# Patient Record
Sex: MALE | Race: BLACK OR AFRICAN AMERICAN | Employment: UNEMPLOYED | URBAN - METROPOLITAN AREA
[De-identification: names, ages, dates, MRNs, and addresses within clinical notes are randomized per-mention and may not be internally consistent; named-entity substitution may affect disease eponyms.]

---

## 2017-06-23 ENCOUNTER — HOSPITAL ENCOUNTER (INPATIENT)
Facility: HOSPITAL | Age: 47
LOS: 2 days | Discharge: HOME | DRG: 065 | End: 2017-06-26
Attending: EMERGENCY MEDICINE | Admitting: HOSPITALIST
Payer: MEDICAID

## 2017-06-23 ENCOUNTER — APPOINTMENT (OUTPATIENT)
Dept: CT IMAGING | Facility: HOSPITAL | Age: 47
DRG: 065 | End: 2017-06-23
Payer: MEDICAID

## 2017-06-23 DIAGNOSIS — I63.9 ACUTE CVA (CEREBROVASCULAR ACCIDENT) (HCC): ICD-10-CM

## 2017-06-23 DIAGNOSIS — I63.9 CEREBROVASCULAR ACCIDENT (CVA), UNSPECIFIED MECHANISM (HCC): Primary | ICD-10-CM

## 2017-06-23 PROCEDURE — 70450 CT HEAD/BRAIN W/O DYE: CPT | Performed by: EMERGENCY MEDICINE

## 2017-06-24 ENCOUNTER — APPOINTMENT (OUTPATIENT)
Dept: ULTRASOUND IMAGING | Facility: HOSPITAL | Age: 47
DRG: 065 | End: 2017-06-24
Attending: HOSPITALIST
Payer: MEDICAID

## 2017-06-24 ENCOUNTER — APPOINTMENT (OUTPATIENT)
Dept: CV DIAGNOSTICS | Facility: HOSPITAL | Age: 47
DRG: 065 | End: 2017-06-24
Attending: HOSPITALIST
Payer: MEDICAID

## 2017-06-24 ENCOUNTER — APPOINTMENT (OUTPATIENT)
Dept: GENERAL RADIOLOGY | Facility: HOSPITAL | Age: 47
DRG: 065 | End: 2017-06-24
Attending: EMERGENCY MEDICINE
Payer: MEDICAID

## 2017-06-24 LAB
ANION GAP SERPL CALC-SCNC: 8 MMOL/L (ref 0–18)
APTT PPP: 28.8 SECONDS (ref 23.2–35.3)
BASOPHILS # BLD: 0.1 K/UL (ref 0–0.2)
BASOPHILS NFR BLD: 1 %
BUN SERPL-MCNC: 17 MG/DL (ref 8–20)
BUN/CREAT SERPL: 10.2 (ref 10–20)
BZE UR QL SCN: DETECTED
CALCIUM SERPL-MCNC: 8.4 MG/DL (ref 8.5–10.5)
CHLORIDE SERPL-SCNC: 107 MMOL/L (ref 95–110)
CHOLEST SERPL-MCNC: 255 MG/DL (ref 110–200)
CHOLEST SERPL-MCNC: 267 MG/DL (ref 110–200)
CO2 SERPL-SCNC: 27 MMOL/L (ref 22–32)
CREAT SERPL-MCNC: 1.67 MG/DL (ref 0.5–1.5)
EOSINOPHIL # BLD: 0.1 K/UL (ref 0–0.7)
EOSINOPHIL NFR BLD: 2 %
ERYTHROCYTE [DISTWIDTH] IN BLOOD BY AUTOMATED COUNT: 15.9 % (ref 11–15)
ERYTHROCYTE [SEDIMENTATION RATE] IN BLOOD: 13 MM/HR (ref 0–15)
GLUCOSE BLDC GLUCOMTR-MCNC: 120 MG/DL (ref 70–99)
GLUCOSE BLDC GLUCOMTR-MCNC: 121 MG/DL (ref 70–99)
GLUCOSE BLDC GLUCOMTR-MCNC: 183 MG/DL (ref 70–99)
GLUCOSE BLDC GLUCOMTR-MCNC: 75 MG/DL (ref 70–99)
GLUCOSE BLDC GLUCOMTR-MCNC: 75 MG/DL (ref 70–99)
GLUCOSE SERPL-MCNC: 92 MG/DL (ref 70–99)
HBA1C MFR BLD: 6.2 % (ref 4–6)
HCT VFR BLD AUTO: 30.5 % (ref 41–52)
HDLC SERPL-MCNC: 112 MG/DL
HGB BLD-MCNC: 10.4 G/DL (ref 13.5–17.5)
INR BLD: 0.8 (ref 0.9–1.2)
INR BLD: 0.9 (ref 0.9–1.2)
LDLC SERPL CALC-MCNC: 133 MG/DL (ref 0–99)
LYMPHOCYTES # BLD: 2.2 K/UL (ref 1–4)
LYMPHOCYTES NFR BLD: 43 %
MCH RBC QN AUTO: 31.1 PG (ref 27–32)
MCHC RBC AUTO-ENTMCNC: 34.1 G/DL (ref 32–37)
MCV RBC AUTO: 91.3 FL (ref 80–100)
MONOCYTES # BLD: 0.4 K/UL (ref 0–1)
MONOCYTES NFR BLD: 7 %
NEUTROPHILS # BLD AUTO: 2.4 K/UL (ref 1.8–7.7)
NEUTROPHILS NFR BLD: 47 %
NONHDLC SERPL-MCNC: 155 MG/DL
OSMOLALITY UR CALC.SUM OF ELEC: 295 MOSM/KG (ref 275–295)
PLATELET # BLD AUTO: 221 K/UL (ref 140–400)
PMV BLD AUTO: 6.9 FL (ref 7.4–10.3)
POTASSIUM SERPL-SCNC: 3.4 MMOL/L (ref 3.3–5.1)
POTASSIUM SERPL-SCNC: 3.9 MMOL/L (ref 3.3–5.1)
PROLACTIN SERPL-MCNC: 3.9 NG/ML (ref 1.4–24.2)
PROTHROMBIN TIME: 11 SECONDS (ref 11.8–14.5)
PROTHROMBIN TIME: 11.8 SECONDS (ref 11.8–14.5)
RBC # BLD AUTO: 3.34 M/UL (ref 4.5–5.9)
SODIUM SERPL-SCNC: 142 MMOL/L (ref 136–144)
TRIGL SERPL-MCNC: 109 MG/DL (ref 1–149)
TROPONIN I SERPL-MCNC: 0 NG/ML (ref ?–0.03)
TSH SERPL-ACNC: 92.43 UIU/ML (ref 0.45–5.33)
WBC # BLD AUTO: 5.1 K/UL (ref 4–11)

## 2017-06-24 PROCEDURE — 93306 TTE W/DOPPLER COMPLETE: CPT | Performed by: HOSPITALIST

## 2017-06-24 PROCEDURE — 99254 IP/OBS CNSLTJ NEW/EST MOD 60: CPT | Performed by: OTHER

## 2017-06-24 PROCEDURE — 93880 EXTRACRANIAL BILAT STUDY: CPT | Performed by: HOSPITALIST

## 2017-06-24 PROCEDURE — 71010 XR CHEST AP PORTABLE  (CPT=71010): CPT | Performed by: EMERGENCY MEDICINE

## 2017-06-24 RX ORDER — ATORVASTATIN CALCIUM 40 MG/1
40 TABLET, FILM COATED ORAL NIGHTLY
Status: DISCONTINUED | OUTPATIENT
Start: 2017-06-24 | End: 2017-06-26

## 2017-06-24 RX ORDER — DEXTROSE MONOHYDRATE 25 G/50ML
50 INJECTION, SOLUTION INTRAVENOUS AS NEEDED
Status: DISCONTINUED | OUTPATIENT
Start: 2017-06-24 | End: 2017-06-26

## 2017-06-24 RX ORDER — ARIPIPRAZOLE 15 MG/1
15 TABLET ORAL DAILY
Status: DISCONTINUED | OUTPATIENT
Start: 2017-06-24 | End: 2017-06-26

## 2017-06-24 RX ORDER — ACETAMINOPHEN 325 MG/1
650 TABLET ORAL EVERY 4 HOURS PRN
Status: DISCONTINUED | OUTPATIENT
Start: 2017-06-24 | End: 2017-06-26

## 2017-06-24 RX ORDER — POTASSIUM CHLORIDE 20 MEQ/1
40 TABLET, EXTENDED RELEASE ORAL EVERY 4 HOURS
Status: DISPENSED | OUTPATIENT
Start: 2017-06-24 | End: 2017-06-24

## 2017-06-24 RX ORDER — ASPIRIN 325 MG
TABLET ORAL
Status: COMPLETED
Start: 2017-06-24 | End: 2017-06-24

## 2017-06-24 RX ORDER — LIDOCAINE 50 MG/G
1 OINTMENT TOPICAL 3 TIMES DAILY PRN
COMMUNITY

## 2017-06-24 RX ORDER — ARIPIPRAZOLE 15 MG/1
15 TABLET ORAL DAILY
COMMUNITY

## 2017-06-24 RX ORDER — LEVOTHYROXINE SODIUM 0.03 MG/1
25 TABLET ORAL
Status: DISCONTINUED | OUTPATIENT
Start: 2017-06-24 | End: 2017-06-25

## 2017-06-24 RX ORDER — ACETAMINOPHEN 650 MG/1
650 SUPPOSITORY RECTAL EVERY 4 HOURS PRN
Status: DISCONTINUED | OUTPATIENT
Start: 2017-06-24 | End: 2017-06-26

## 2017-06-24 RX ORDER — LIDOCAINE 50 MG/G
1 OINTMENT TOPICAL 3 TIMES DAILY PRN
Status: DISCONTINUED | OUTPATIENT
Start: 2017-06-24 | End: 2017-06-26

## 2017-06-24 RX ORDER — DEXTROSE MONOHYDRATE 25 G/50ML
50 INJECTION, SOLUTION INTRAVENOUS AS NEEDED
Status: DISCONTINUED | OUTPATIENT
Start: 2017-06-24 | End: 2017-06-24

## 2017-06-24 NOTE — SLP NOTE
Orders received to evaluate and treat Pt. Pt was admitted with reported \"slurred speech\". Pt refused Speech Evaluation and stated \" Don't care if my speech is slurred; let it stay that way. \"    Pt did agree to Bedside Swallow Evaluation.  Refer to compl

## 2017-06-24 NOTE — CONSULTS
[facility]    Report of Consultation    Noel Collet Patient Status:  Inpatient    1970 MRN [id number]   Location [facility] 3W/SW Attending Venus Belle,    Hosp Day # 0 PCP None Pcp     Date of Admission:  2017  Date of 25 mcg Oral Before breakfast   lidocaine (XYLOCAINE) 5 % ointment 1 Application 1 Application Topical TID PRN   dextrose 50% injection 50 mL 50 mL Intravenous PRN   Glucose-Vitamin C (DEX-4) 4-0.006 g chewable tab 4 tablet 4 tablet Oral Q15 Min PRN   insul normal attention span and concentration. Speech and language normal.  Oriented times three. Recent and remote memory intact, with normal fund of knowledge. Cranial Nerves: II-Visual acuity grossly normal, with full visual fields. Pupil react to light. account for his symptoms. He does have stroke risk factors of hypertension, diabetes, and cocaine use. Recommendations:  I will initiate stroke protocol. 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   MRI and MRA brain would

## 2017-06-24 NOTE — ED PROVIDER NOTES
Patient Seen in: [facility] 3w/sw    History   Patient presents with:  Numbness Weakness (neurologic)    Stated Complaint: Left sided weakness    HPI    10-year-old male with history of diabetes and thyroid cancer in remission presents with compla 112/81 (BP Location: Right arm)   Pulse (!) 49   Temp 98.1 °F (36.7 °C) (Oral)   Resp 18   Ht 188 cm (6' 2.02\")   Wt 81.7 kg   SpO2 99%   BMI 23.11 kg/m²         Physical Exam    General Appearance: alert, no distress  Eyes: pupils equal and round no pall 7.0                  150                     8.0                  180             100-260       9.0                  210             130-290   CHOLESTEROL, TOTAL - Abnormal; Notable for the following:     Cholesterol, Total 255 (*)     All other comp DRAW LAVENDER   RAINBOW DRAW LIGHT GREEN   RAINBOW DRAW LAVENDER TALL (BNP)   RAINBOW DRAW DARK GREEN     EKG    Rate, intervals and axes as noted on EKG Report.   Rate: 58  Rhythm: Sinus Rhythm  Axis: Normal  Reading: Nonspecific EKG           ============

## 2017-06-24 NOTE — ED INITIAL ASSESSMENT (HPI)
Patient noticed left sided weakness 1 week ago, worsening today. Feels his speech is slurred, and has noticed some numbness on his left side.

## 2017-06-24 NOTE — OCCUPATIONAL THERAPY NOTE
Chart reviewed with orders received. Spoke with neurologist to clarify activity orders; per conversation patient is not on bedrest and able to get out of bed with therapy teams. Patient is currently off unit for 2D echo and then to have MRI done.  Will foll

## 2017-06-24 NOTE — H&P
[facility] Hospitalist H&P       CC: Patient presents with:  Numbness Weakness (neurologic)       PCP: None Pcp    ASSESSMENT / PLAN:    Patient is a 48year old male with PMH sig for thyroid ca in remission s/p thyroidectomy and DM2 who presents with a c/co of L ([facility] [address]         [address]  History reviewed. No pertinent surgical history.      ALL:  No Known Allergies     Home Medications:    Outpatient Prescriptions Marked as Taking for [facility]):  aripiprazole 15 MG Oral Tab Take 15 mg by m Neurologic: LUE strength is 4/5, LLE strength 4/5, RUE and LE 5/5, L facial droop, dysarthria present     Diagnostic Data:    CBC/Chem    Recent Labs   Lab  06/23/17   2344   RBC  3.34*   HGB  10.4*   HCT  30.5*   MCV  91.3   MCH  31.1   MCHC  34.1   RDW

## 2017-06-24 NOTE — SLP NOTE
ADULT SWALLOWING EVALUATION    ASSESSMENT & PLAN   ASSESSMENT    Pt assessed sitting upright in bed. Pt required encouragement to participate. Pt self-fed solid and thin liquid trials. Bilabial seal adequate with no anterior loss.  Lingual skills slightly Prior Living Situation: Unknown  Diet Prior to Admission: Regular; Thin liquids  Precautions: None    Patient/Family Goals: \"to sleep\"     SWALLOWING HISTORY  Current Diet Consistency: Regular; Thin liquids  Dysphagia History: No PMH of dysphagia at

## 2017-06-24 NOTE — PLAN OF CARE
CARDIOVASCULAR - ADULT    • Maintains optimal cardiac output and hemodynamic stability Progressing    • Absence of cardiac arrhythmias or at baseline  NSR.  SB. DENIES CHEST PAIN AT THIS TIME Progressing        Diabetes/Glucose Control    • Glucose maintain

## 2017-06-24 NOTE — PLAN OF CARE
[]Manual[]Template  []Copied  Patient refused 6pm vs and neuro assessment.  Stated too many tests, I need rest

## 2017-06-25 ENCOUNTER — APPOINTMENT (OUTPATIENT)
Dept: MRI IMAGING | Facility: HOSPITAL | Age: 47
DRG: 065 | End: 2017-06-25
Attending: Other
Payer: MEDICAID

## 2017-06-25 LAB
CORTIS SERPL-MCNC: 13.8 MCG/DL
GLUCOSE BLDC GLUCOMTR-MCNC: 100 MG/DL (ref 70–99)
GLUCOSE BLDC GLUCOMTR-MCNC: 69 MG/DL (ref 70–99)
GLUCOSE BLDC GLUCOMTR-MCNC: 78 MG/DL (ref 70–99)
GLUCOSE BLDC GLUCOMTR-MCNC: 79 MG/DL (ref 70–99)
GLUCOSE BLDC GLUCOMTR-MCNC: 99 MG/DL (ref 70–99)

## 2017-06-25 PROCEDURE — 70551 MRI BRAIN STEM W/O DYE: CPT | Performed by: OTHER

## 2017-06-25 PROCEDURE — 70544 MR ANGIOGRAPHY HEAD W/O DYE: CPT | Performed by: OTHER

## 2017-06-25 PROCEDURE — 99232 SBSQ HOSP IP/OBS MODERATE 35: CPT | Performed by: OTHER

## 2017-06-25 RX ORDER — LEVOTHYROXINE SODIUM 0.05 MG/1
50 TABLET ORAL
Status: DISCONTINUED | OUTPATIENT
Start: 2017-06-26 | End: 2017-06-26

## 2017-06-25 NOTE — OCCUPATIONAL THERAPY NOTE
OCCUPATIONAL THERAPY EVALUATION - INPATIENT     Room Number: 333/333-A  Evaluation Date: 6/25/2017  Type of Evaluation: Initial  Presenting Problem: CVA    Physician Order: IP Consult to Occupational Therapy  Reason for Therapy: ADL/IADL Dysfunction and Rosary Rice Tracts Problem List  Active Problems:    Cerebrovascular accident (CVA) [facility])      Past Medical History  Past Medical History:   Diagnosis Date   • Thyroid cancer ([facility] [address]     IN REMISSION    • Type 1 diabetes mellitus ([facility] [address]        Past Surgical History  History Daily Activity Short Form  How much help from another person does the patient currently need…  -   Putting on and taking off regular lower body clothing?: A Little  -   Bathing (including washing, rinsing, drying)?: A Little  -   Toileting, which includes

## 2017-06-25 NOTE — PHYSICAL THERAPY NOTE
PHYSICAL THERAPY EVALUATION - INPATIENT     Room Number: 333/333-A  Evaluation Date: 6/25/2017  Type of Evaluation: Initial  Physician Order: PT Eval and Treat    Presenting Problem: CVA  Reason for Therapy: Mobility Dysfunction and Discharge Planning training;Strengthening;Neuromuscular re-educate  Rehab Potential : Good  Frequency (Obs): Daily       PHYSICAL THERAPY MEDICAL/SOCIAL HISTORY   Problem List  Active Problems:    Cerebrovascular accident (CVA) [facility])      Past Medical History  Past Medical H on the side of the bed?: A Little   How much help from another person does the patient currently need. ..   -   Moving to and from a bed to a chair (including a wheelchair)?: A Little   -   Need to walk in hospital room?: A Little   -   Climbing 3-5 steps w

## 2017-06-25 NOTE — PROGRESS NOTES
[facility] Hospitalist Progress Note     CC: Hospital Follow up    PCP: None Pcp       Assessment/Plan:    Patient is a 48year old male with PMH sig for thyroid ca in remission s/p thyroidectomy and DM2 who presents with a c/co of L sided weakness.     L sided w 97.9 °F (36.6 °C), temperature source Oral, resp. rate 16, height 188 cm (6' 2.02\"), weight 173 lb 3.2 oz (78.6 kg), SpO2 99 %.     Temp:  [97.7 °F (36.5 °C)-98.2 °F (36.8 °C)] 97.9 °F (36.6 °C)  Pulse:  [49-61] 55  Resp:  [16-18] 16  BP: (110-119)/(72-85) Oral Daily   • Levothyroxine Sodium  25 mcg Oral Before breakfast   • atorvastatin  40 mg Oral Nightly   • insulin aspart  1-5 Units Subcutaneous TID CC        lidocaine, acetaminophen **OR** acetaminophen, dextrose 50%, Glucose-Vitamin C

## 2017-06-25 NOTE — PROGRESS NOTES
[facility]    Progress Note    Sabrina Cloud Patient Status:  Inpatient    1970 MRN [id number]   Location [facility] 3W/SW Attending Leslie Thomason,    Hosp Day # 1 PCP None Pcp       Subjective:   Sabrina Cloud is a(n) 48year old Daily   Levothyroxine Sodium (SYNTHROID, LEVOTHROID) tab 25 mcg 25 mcg Oral Before breakfast   lidocaine (XYLOCAINE) 5 % ointment 1 Application 1 Application Topical TID PRN   acetaminophen (TYLENOL) tab 650 mg 650 mg Oral Q4H PRN   Or      acetaminophen (

## 2017-06-26 VITALS
SYSTOLIC BLOOD PRESSURE: 100 MMHG | RESPIRATION RATE: 18 BRPM | HEIGHT: 74.02 IN | WEIGHT: 168.19 LBS | OXYGEN SATURATION: 97 % | BODY MASS INDEX: 21.58 KG/M2 | TEMPERATURE: 98 F | HEART RATE: 69 BPM | DIASTOLIC BLOOD PRESSURE: 65 MMHG

## 2017-06-26 PROBLEM — I63.9 ACUTE CVA (CEREBROVASCULAR ACCIDENT) (HCC): Status: ACTIVE | Noted: 2017-06-26

## 2017-06-26 LAB
ANION GAP SERPL CALC-SCNC: 9 MMOL/L (ref 0–18)
BASOPHILS # BLD: 0 K/UL (ref 0–0.2)
BASOPHILS NFR BLD: 1 %
BUN SERPL-MCNC: 15 MG/DL (ref 8–20)
BUN/CREAT SERPL: 10.9 (ref 10–20)
CALCIUM SERPL-MCNC: 9.1 MG/DL (ref 8.5–10.5)
CHLORIDE SERPL-SCNC: 98 MMOL/L (ref 95–110)
CO2 SERPL-SCNC: 29 MMOL/L (ref 22–32)
CREAT SERPL-MCNC: 1.37 MG/DL (ref 0.5–1.5)
EOSINOPHIL # BLD: 0 K/UL (ref 0–0.7)
EOSINOPHIL NFR BLD: 1 %
ERYTHROCYTE [DISTWIDTH] IN BLOOD BY AUTOMATED COUNT: 15.8 % (ref 11–15)
GLUCOSE BLDC GLUCOMTR-MCNC: 99 MG/DL (ref 70–99)
GLUCOSE SERPL-MCNC: 121 MG/DL (ref 70–99)
HCT VFR BLD AUTO: 37.4 % (ref 41–52)
HGB BLD-MCNC: 12.5 G/DL (ref 13.5–17.5)
LYMPHOCYTES # BLD: 1.3 K/UL (ref 1–4)
LYMPHOCYTES NFR BLD: 43 %
MCH RBC QN AUTO: 30.3 PG (ref 27–32)
MCHC RBC AUTO-ENTMCNC: 33.4 G/DL (ref 32–37)
MCV RBC AUTO: 90.8 FL (ref 80–100)
MONOCYTES # BLD: 0.3 K/UL (ref 0–1)
MONOCYTES NFR BLD: 8 %
NEUTROPHILS # BLD AUTO: 1.5 K/UL (ref 1.8–7.7)
NEUTROPHILS NFR BLD: 48 %
OSMOLALITY UR CALC.SUM OF ELEC: 284 MOSM/KG (ref 275–295)
PLATELET # BLD AUTO: 250 K/UL (ref 140–400)
PMV BLD AUTO: 7 FL (ref 7.4–10.3)
POTASSIUM SERPL-SCNC: 3.9 MMOL/L (ref 3.3–5.1)
RBC # BLD AUTO: 4.12 M/UL (ref 4.5–5.9)
SODIUM SERPL-SCNC: 136 MMOL/L (ref 136–144)
WBC # BLD AUTO: 3.2 K/UL (ref 4–11)

## 2017-06-26 PROCEDURE — 99232 SBSQ HOSP IP/OBS MODERATE 35: CPT | Performed by: OTHER

## 2017-06-26 RX ORDER — ASPIRIN 81 MG/1
81 TABLET ORAL DAILY
Qty: 30 TABLET | Refills: 0 | Status: SHIPPED | OUTPATIENT
Start: 2017-06-26

## 2017-06-26 RX ORDER — ASPIRIN 81 MG/1
81 TABLET ORAL DAILY
Status: DISCONTINUED | OUTPATIENT
Start: 2017-06-26 | End: 2017-06-26

## 2017-06-26 RX ORDER — ATORVASTATIN CALCIUM 40 MG/1
40 TABLET, FILM COATED ORAL NIGHTLY
Qty: 30 TABLET | Refills: 0 | Status: SHIPPED | OUTPATIENT
Start: 2017-06-26

## 2017-06-26 NOTE — PROGRESS NOTES
[facility]    Progress Note    Bethanie Mazariegos Patient Status:  Inpatient    1970 MRN [id number]   Location [facility] 3W/SW Attending Dipti Thomason, [address] [facility] Day # 2 PCP None Pcp       Subjective:   Bethanie Mazariegos is a(n) 48year old his primary care physician to monitor his total cholesterol and LDL. He will be started on low-dose aspirin. 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 Growth hormone and prolactin levels were unremarkable.   TSH is markedly heather 06/24/2017   TROP 0.00 06/23/2017       Mra Brain (zlu=[id number])    Result Date: 6/25/2017  CONCLUSION:  1. Negative MRA brain. 2. Image degradation by patient motion artifact. 3. 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          Mri

## 2017-06-26 NOTE — PAYOR COMM NOTE
ED Provider Notes signed by Rashawn Ascencio MD at 6/24/2017  6:33 PM     Author: Rashawn Ascencio MD Service: (none) Author Type: Physician   Filed: 6/24/2017  6:33 PM Date of Service: 6/24/2017  6:30 PM Status: Signed   : Rashawn Ascencio MD (Physi All other systems reviewed and negative except as noted above.     PSFH elements reviewed from today and agreed except as otherwise stated in HPI.     Physical Exam   ED Triage Vitals [06/23/17 2337]  BP: 112/71  Pulse: 63  Resp: 16  Temp: 97.6 °F (36.4 °C HEMOGLOBIN A1C - Abnormal; Notable for the following:      Glycohemoglobin (HgA1c) 6.2 (*)       All other components within normal limits     Narrative:      Method HPLC           Expected Results           Fasting Plasma Glucose (mg/dL)     HbA1c(%)   The following orders were created for panel order CBC WITH DIFFERENTIAL WITH PLATELET.   Procedure                               Abnormality         Status                     ---------                               -----------         ------ Description: 48year old M Primary Service: Cardiac Telemetry Unit Info: [address] [place] [address] 3-W/SW   History and Physical     H&P signed by Tomás Rodriguez DO at 6/24/2017  3:48 PM     Author: Tomás Rodriguez DO Service: (none) Author Type: Physician   Filed: 6/24/2017  3:48 P History of Present Illness: Patient is a 48year old male with PMH sig for thyroid ca in remission s/p thyroidectomy and DM2 who presents with a c/co of L sided weakness.  Pt not cooperative during exam and was trying to sleep, did not answer all of my ques Eyes:  Sclera anicteric, No conjunctival pallor, not fully cooperative with full EOM exam, but appear intact, PERRLA, vision intact   Nose: Nares normal. Mucosa normal. No drainage.    Throat: Lips, mucosa, and tongue normal. Teeth and gums normal.   Neck:    No results for input(s): ALT, AST, ALB, AMYLASE, LIPASE, LDH in the last 72 hours.     Invalid input(s): ALPHOS, TBIL, DBIL, TPROT            Recent Labs   Lab  06/23/17   2345   TROP  0.00         Additional Diagnostics: ECG:sinus bradycardia, t wave i Patient is a 61-year-old right-handed male who was well until about 5 days ago when he noted weakness of his left face, arm, and leg.   His family encouraged him to come to the emergency department, but he did not want to come to the hospital.  Last night h Prescriptions Prior to Admission:  aripiprazole 15 MG Oral Tab Take 15 mg by mouth daily. lidocaine 5 % External Ointment Apply 1 Application topically 3 (three) times daily as needed.    MetFORMIN HCl 1000 MG Oral Tab Take 500 mg by mouth 2 (two) times d Cranial Nerves: II-Visual acuity grossly normal, with full visual fields. Pupil react to light. Fundoscopic exam normal.  III,IV,VI- EOM full, with normal pursuit. V-Facial sensation intact, with symmetric corneal reflex.  VII-mild left facial droop VIII- I will initiate stroke protocol. 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 MRI and MRA brain would be recommended to evaluate the pituitary lesion. Carotid ultrasound and echocardiogram were both ordered.   Decision on further CARDIOVASCULAR: denies chest pain or HUNT; no palpitations   GI: denies nausea, vomiting, constipation, diarrhea; no heartburn  GENITAL/: no dysuria, urgency or frequency; no nocturia  MUSCULOSKELETAL: no joint complaints upper or lower extremities  PSYCH       Xr Chest Ap Portable  (cpt=71010)     Result Date: 6/24/2017  CONCLUSION:            1.  Negative portable chest.     No major discrepancy with preliminary [facility] radiology report.         Ekg 12-lead     Result Date: 6/24/2017  ECG Report  Interpreta HEENT: denies nasal congestion, sinus pain or sore throat; hearing loss negative  RESPIRATORY: denies shortness of breath, wheezing or cough   CARDIOVASCULAR: denies chest pain or HUNT; no palpitations   GI: denies nausea, vomiting, constipation, diarrhea; acetaminophen (TYLENOL) tab 650 mg 650 mg Oral Q4H PRN   Or         acetaminophen (TYLENOL) 650 MG rectal suppository 650 mg 650 mg Rectal Q4H PRN   atorvastatin (LIPITOR) tab 40 mg 40 mg Oral Nightly   dextrose 50% injection 50 mL 50 mL Intravenous PRN General Medicine Discharge Summary      Patient ID:  Cristina Brooks  48year old  9/5/1970     Admit date: 6/23/2017     Discharge date and time: 06/26/17     Attending Physician: Asa Gautam DO      Primary Care Physician: None Pcp [address] -echo with EF 50-55%, no WMA, no mention of thrombus, carotid doppler normal  -check MRI/MRA brain to eval for CVA and further eval ?pituitary hemorrhage  -lipid panel with elevated ldl and t cholesterol, started statin     Possible pituitary hemorrhage/pi Result Date: 6/24/2017  CONCLUSION:            1.  Negative portable chest.     No major discrepancy with preliminary [facility] radiology report.            Disposition: home     Home Medication Changes:             Medication List       START taking these med Carmine Barnes #[id number]  (52 year old M)     [facility] [id number]   Jacob Kayser, MD Physician Addendum Hospitalist Discharge Summaries Date of Service: 6/26/2017 10:33 AM         []Manual[]Template  []Copied  General Medicine Discharge Summary     Patient is a 48year old male with PMH sig for thyroid ca in remission s/p thyroidectomy and DM2 who presents with a c/co of L sided weakness. 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  Neuro recommended MRI brain which also did not show Exam  GEN: NAD  HEENT: EOMI  Neck: no JVD  Pulm: CTAB, normal respiratory effort  CV: RRR, no murmurs, peripheral perfusion intact  ABD: Soft, non-tender, non-distended, no HSM  MSK: strength 4/5 LUE, 4-5/5 LLE, rue and rle- 5/5, sensation decreased L hand Code Status: No Order  O2: n/a     Follow-up with:    PCP   Specialist Neurology in 1 month         FU      Follow-up Information      Kevon Delgado MD In 1 month.     Specialty:  NEUROLOGY  Contact information:  Umer Trejo  [address] [facility]

## 2017-06-26 NOTE — DISCHARGE SUMMARY
General Medicine Discharge Summary     Patient ID:  Malvin Reynolds  48year old  9/5/1970    Admit date: 6/23/2017    Discharge date and time: 06/26/17    Attending Physician: Vikas Suggs DO     Primary Care Physician: None Pcp 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     No major discrepancy with preliminary [facility] radiology report.           Disposition: home    Home Medication Changes:          Medication List      START taking these medications    aspirin 81 MG Tbec  Take 1 tablet (81 mg tota

## 2017-06-27 ENCOUNTER — TELEPHONE (OUTPATIENT)
Dept: CARDIOLOGY UNIT | Facility: HOSPITAL | Age: 47
End: 2017-06-27

## 2017-06-27 LAB — GROWTH HORMONE BASELINE: 0.17 NG/ML

## 2017-06-30 ENCOUNTER — TELEPHONE (OUTPATIENT)
Dept: NEUROLOGY | Facility: CLINIC | Age: 47
End: 2017-06-30

## 2017-06-30 NOTE — TELEPHONE ENCOUNTER
Left message to call back. Need to clarify about request for return to work letter.  (Dr. Aguilar Jason said he saw patient in hospital.)

## 2017-06-30 NOTE — TELEPHONE ENCOUNTER
Dr. Laurie Celaya has prepared and signed a \"return to work\" letter. Letter placed in front office drawer. Await call back from patient to inform him of letter.

## 2021-06-30 ENCOUNTER — APPOINTMENT (OUTPATIENT)
Dept: MRI IMAGING | Age: 51
End: 2021-06-30
Attending: HOSPITALIST

## 2021-06-30 ENCOUNTER — HOSPITAL ENCOUNTER (OUTPATIENT)
Age: 51
Setting detail: OBSERVATION
LOS: 1 days | Discharge: HOME | End: 2021-07-01
Attending: EMERGENCY MEDICINE | Admitting: GENERAL ACUTE CARE HOSPITAL

## 2021-06-30 ENCOUNTER — APPOINTMENT (OUTPATIENT)
Dept: CT IMAGING | Age: 51
End: 2021-06-30
Attending: EMERGENCY MEDICINE

## 2021-06-30 ENCOUNTER — APPOINTMENT (OUTPATIENT)
Dept: GENERAL RADIOLOGY | Age: 51
End: 2021-06-30
Attending: EMERGENCY MEDICINE

## 2021-06-30 DIAGNOSIS — R53.1 ACUTE LEFT-SIDED WEAKNESS: Primary | ICD-10-CM

## 2021-06-30 DIAGNOSIS — I69.359 HEMIPLEGIA FOLLOWING CEREBROVASCULAR ACCIDENT (CMD): ICD-10-CM

## 2021-06-30 DIAGNOSIS — I63.9 CEREBROVASCULAR ACCIDENT (CVA), UNSPECIFIED MECHANISM (CMD): ICD-10-CM

## 2021-06-30 PROBLEM — F25.1 SCHIZOAFFECTIVE DISORDER, DEPRESSIVE TYPE (CMD): Status: ACTIVE | Noted: 2017-09-11

## 2021-06-30 PROBLEM — E89.0 HX OF TOTAL THYROIDECTOMY: Status: ACTIVE | Noted: 2017-08-23

## 2021-06-30 PROBLEM — E89.0 POSTOPERATIVE HYPOTHYROIDISM: Status: ACTIVE | Noted: 2017-08-23

## 2021-06-30 PROBLEM — C73 THYROID CANCER (CMD): Status: ACTIVE | Noted: 2017-08-23

## 2021-06-30 PROBLEM — J44.9 COPD (CHRONIC OBSTRUCTIVE PULMONARY DISEASE) (CMD): Status: ACTIVE | Noted: 2021-06-30

## 2021-06-30 LAB
ALBUMIN SERPL-MCNC: 3.4 G/DL (ref 3.6–5.1)
ALP SERPL-CCNC: 65 UNITS/L (ref 45–117)
ALT SERPL-CCNC: 51 UNITS/L
AMPHETAMINES UR QL SCN>500 NG/ML: NEGATIVE
ANION GAP SERPL CALC-SCNC: 13 MMOL/L (ref 10–20)
APPEARANCE UR: CLEAR
APTT PPP: 22 SEC (ref 22–30)
AST SERPL-CCNC: 51 UNITS/L
BARBITURATES UR QL SCN>200 NG/ML: NEGATIVE
BASOPHILS # BLD: 0 K/MCL (ref 0–0.3)
BASOPHILS NFR BLD: 0 %
BENZODIAZ UR QL SCN>200 NG/ML: NEGATIVE
BILIRUB CONJ SERPL-MCNC: 0.1 MG/DL (ref 0–0.2)
BILIRUB SERPL-MCNC: 0.5 MG/DL (ref 0.2–1)
BILIRUB UR QL STRIP: NEGATIVE
BUN SERPL-MCNC: 17 MG/DL (ref 6–20)
BUN/CREAT SERPL: 16 (ref 7–25)
BZE UR QL SCN>150 NG/ML: POSITIVE
CALCIUM SERPL-MCNC: 8 MG/DL (ref 8.4–10.2)
CANNABINOIDS UR QL SCN>50 NG/ML: NEGATIVE
CHLORIDE SERPL-SCNC: 106 MMOL/L (ref 98–107)
CK SERPL-CCNC: 664 UNITS/L (ref 39–308)
CO2 SERPL-SCNC: 28 MMOL/L (ref 21–32)
COLOR UR: YELLOW
CREAT SERPL-MCNC: 1.05 MG/DL (ref 0.67–1.17)
DEPRECATED RDW RBC: 50.7 FL (ref 39–50)
EOSINOPHIL # BLD: 0 K/MCL (ref 0–0.5)
EOSINOPHIL NFR BLD: 0 %
ERYTHROCYTE [DISTWIDTH] IN BLOOD: 14.6 % (ref 11–15)
FASTING DURATION TIME PATIENT: ABNORMAL H
GFR SERPLBLD BASED ON 1.73 SQ M-ARVRAT: >90 ML/MIN/1.73M2
GLUCOSE SERPL-MCNC: 122 MG/DL (ref 65–99)
GLUCOSE UR STRIP-MCNC: NEGATIVE MG/DL
HCT VFR BLD CALC: 34.1 % (ref 39–51)
HGB BLD-MCNC: 11.4 G/DL (ref 13–17)
HGB UR QL STRIP: NEGATIVE
IMM GRANULOCYTES # BLD AUTO: 0 K/MCL (ref 0–0.2)
IMM GRANULOCYTES # BLD: 0 %
INR PPP: 1
KETONES UR STRIP-MCNC: NEGATIVE MG/DL
LACTATE BLDV-SCNC: 1.2 MMOL/L (ref 0–2)
LEUKOCYTE ESTERASE UR QL STRIP: NEGATIVE
LYMPHOCYTES # BLD: 2 K/MCL (ref 1–4)
LYMPHOCYTES NFR BLD: 31 %
MCH RBC QN AUTO: 31.2 PG (ref 26–34)
MCHC RBC AUTO-ENTMCNC: 33.4 G/DL (ref 32–36.5)
MCV RBC AUTO: 93.4 FL (ref 78–100)
MONOCYTES # BLD: 0.5 K/MCL (ref 0.3–0.9)
MONOCYTES NFR BLD: 8 %
NEUTROPHILS # BLD: 3.9 K/MCL (ref 1.8–7.7)
NEUTROPHILS NFR BLD: 61 %
NITRITE UR QL STRIP: NEGATIVE
NRBC BLD MANUAL-RTO: 0 /100 WBC
OPIATES UR QL SCN>300 NG/ML: NEGATIVE
PCP UR QL SCN>25 NG/ML: NEGATIVE
PH UR STRIP: 5.5 [PH] (ref 5–7)
PLATELET # BLD AUTO: 210 K/MCL (ref 140–450)
POTASSIUM SERPL-SCNC: 3.7 MMOL/L (ref 3.4–5.1)
PROT SERPL-MCNC: 6.7 G/DL (ref 6.4–8.2)
PROT UR STRIP-MCNC: NEGATIVE MG/DL
PROTHROMBIN TIME: 10.5 SEC (ref 9.7–11.8)
RAINBOW EXTRA TUBES HOLD SPECIMEN: NORMAL
RAINBOW EXTRA TUBES HOLD SPECIMEN: NORMAL
RBC # BLD: 3.65 MIL/MCL (ref 4.5–5.9)
SARS-COV-2 RNA RESP QL NAA+PROBE: NOT DETECTED
SERVICE CMNT-IMP: NORMAL
SERVICE CMNT-IMP: NORMAL
SODIUM SERPL-SCNC: 143 MMOL/L (ref 135–145)
SP GR UR STRIP: 1.01 (ref 1–1.03)
T4 FREE SERPL-MCNC: 0.8 NG/DL (ref 0.8–1.5)
TROPONIN I SERPL HS-MCNC: <0.02 NG/ML
TSH SERPL-ACNC: 17.1 MCUNITS/ML (ref 0.35–5)
UROBILINOGEN UR STRIP-MCNC: 2 MG/DL
WBC # BLD: 6.4 K/MCL (ref 4.2–11)

## 2021-06-30 PROCEDURE — 10002803 HB RX 637: Performed by: EMERGENCY MEDICINE

## 2021-06-30 PROCEDURE — G1004 CDSM NDSC: HCPCS

## 2021-06-30 PROCEDURE — 10002805 HB CONTRAST AGENT: Performed by: EMERGENCY MEDICINE

## 2021-06-30 PROCEDURE — 10002803 HB RX 637: Performed by: HOSPITALIST

## 2021-06-30 PROCEDURE — 99223 1ST HOSP IP/OBS HIGH 75: CPT | Performed by: HOSPITALIST

## 2021-06-30 PROCEDURE — 0042T CT CEREBRAL PERFUSION W CONTRAST: CPT

## 2021-06-30 PROCEDURE — 10002800 HB RX 250 W HCPCS: Performed by: GENERAL ACUTE CARE HOSPITAL

## 2021-06-30 PROCEDURE — 84484 ASSAY OF TROPONIN QUANT: CPT | Performed by: EMERGENCY MEDICINE

## 2021-06-30 PROCEDURE — 96372 THER/PROPH/DIAG INJ SC/IM: CPT

## 2021-06-30 PROCEDURE — 87635 SARS-COV-2 COVID-19 AMP PRB: CPT | Performed by: EMERGENCY MEDICINE

## 2021-06-30 PROCEDURE — 70496 CT ANGIOGRAPHY HEAD: CPT

## 2021-06-30 PROCEDURE — 10002801 HB RX 250 W/O HCPCS: Performed by: HOSPITALIST

## 2021-06-30 PROCEDURE — 80048 BASIC METABOLIC PNL TOTAL CA: CPT | Performed by: EMERGENCY MEDICINE

## 2021-06-30 PROCEDURE — 93005 ELECTROCARDIOGRAM TRACING: CPT | Performed by: EMERGENCY MEDICINE

## 2021-06-30 PROCEDURE — 84439 ASSAY OF FREE THYROXINE: CPT | Performed by: EMERGENCY MEDICINE

## 2021-06-30 PROCEDURE — 99291 CRITICAL CARE FIRST HOUR: CPT | Performed by: EMERGENCY MEDICINE

## 2021-06-30 PROCEDURE — 99255 IP/OBS CONSLTJ NEW/EST HI 80: CPT | Performed by: PSYCHIATRY & NEUROLOGY

## 2021-06-30 PROCEDURE — 71045 X-RAY EXAM CHEST 1 VIEW: CPT

## 2021-06-30 PROCEDURE — 80307 DRUG TEST PRSMV CHEM ANLYZR: CPT | Performed by: EMERGENCY MEDICINE

## 2021-06-30 PROCEDURE — 99291 CRITICAL CARE FIRST HOUR: CPT

## 2021-06-30 PROCEDURE — 81003 URINALYSIS AUTO W/O SCOPE: CPT | Performed by: EMERGENCY MEDICINE

## 2021-06-30 PROCEDURE — 10006031 HB ROOM CHARGE TELEMETRY

## 2021-06-30 PROCEDURE — 82550 ASSAY OF CK (CPK): CPT | Performed by: EMERGENCY MEDICINE

## 2021-06-30 PROCEDURE — 85025 COMPLETE CBC W/AUTO DIFF WBC: CPT | Performed by: EMERGENCY MEDICINE

## 2021-06-30 PROCEDURE — 85610 PROTHROMBIN TIME: CPT | Performed by: EMERGENCY MEDICINE

## 2021-06-30 PROCEDURE — 10004651 HB RX, NO CHARGE ITEM: Performed by: GENERAL ACUTE CARE HOSPITAL

## 2021-06-30 PROCEDURE — C9803 HOPD COVID-19 SPEC COLLECT: HCPCS

## 2021-06-30 PROCEDURE — 70450 CT HEAD/BRAIN W/O DYE: CPT

## 2021-06-30 PROCEDURE — 93010 ELECTROCARDIOGRAM REPORT: CPT | Performed by: INTERNAL MEDICINE

## 2021-06-30 PROCEDURE — 84443 ASSAY THYROID STIM HORMONE: CPT | Performed by: EMERGENCY MEDICINE

## 2021-06-30 PROCEDURE — 80076 HEPATIC FUNCTION PANEL: CPT | Performed by: EMERGENCY MEDICINE

## 2021-06-30 PROCEDURE — 85730 THROMBOPLASTIN TIME PARTIAL: CPT | Performed by: EMERGENCY MEDICINE

## 2021-06-30 PROCEDURE — 83605 ASSAY OF LACTIC ACID: CPT | Performed by: EMERGENCY MEDICINE

## 2021-06-30 PROCEDURE — 36415 COLL VENOUS BLD VENIPUNCTURE: CPT

## 2021-06-30 PROCEDURE — 70551 MRI BRAIN STEM W/O DYE: CPT

## 2021-06-30 RX ORDER — TIOTROPIUM BROMIDE 18 UG/1
1 CAPSULE ORAL; RESPIRATORY (INHALATION) DAILY
COMMUNITY
Start: 2021-03-31

## 2021-06-30 RX ORDER — ACETAMINOPHEN 325 MG/1
650 TABLET ORAL EVERY 4 HOURS PRN
Status: DISCONTINUED | OUTPATIENT
Start: 2021-06-30 | End: 2021-07-01 | Stop reason: HOSPADM

## 2021-06-30 RX ORDER — LEVOTHYROXINE SODIUM 0.12 MG/1
125 TABLET ORAL DAILY
COMMUNITY
Start: 2021-05-14

## 2021-06-30 RX ORDER — ASPIRIN 300 MG/1
300 SUPPOSITORY RECTAL ONCE
Status: COMPLETED | OUTPATIENT
Start: 2021-06-30 | End: 2021-06-30

## 2021-06-30 RX ORDER — 0.9 % SODIUM CHLORIDE 0.9 %
2 VIAL (ML) INJECTION EVERY 12 HOURS SCHEDULED
Status: DISCONTINUED | OUTPATIENT
Start: 2021-06-30 | End: 2021-07-01 | Stop reason: HOSPADM

## 2021-06-30 RX ORDER — QUETIAPINE FUMARATE 50 MG/1
50 TABLET, FILM COATED ORAL AT BEDTIME
COMMUNITY
Start: 2021-03-31 | End: 2023-01-20 | Stop reason: ALTCHOICE

## 2021-06-30 RX ORDER — POLYETHYLENE GLYCOL 3350 17 G/17G
17 POWDER, FOR SOLUTION ORAL DAILY PRN
Status: DISCONTINUED | OUTPATIENT
Start: 2021-06-30 | End: 2021-07-01 | Stop reason: HOSPADM

## 2021-06-30 RX ORDER — TRAMADOL HYDROCHLORIDE 50 MG/1
50 TABLET ORAL EVERY 6 HOURS PRN
Status: DISCONTINUED | OUTPATIENT
Start: 2021-06-30 | End: 2021-07-01 | Stop reason: HOSPADM

## 2021-06-30 RX ORDER — ALBUTEROL SULFATE 90 UG/1
1 AEROSOL, METERED RESPIRATORY (INHALATION)
COMMUNITY
Start: 2021-05-28 | End: 2023-01-20 | Stop reason: ALTCHOICE

## 2021-06-30 RX ORDER — ONDANSETRON 2 MG/ML
4 INJECTION INTRAMUSCULAR; INTRAVENOUS 2 TIMES DAILY PRN
Status: DISCONTINUED | OUTPATIENT
Start: 2021-06-30 | End: 2021-07-01 | Stop reason: HOSPADM

## 2021-06-30 RX ORDER — QUETIAPINE FUMARATE 25 MG/1
50 TABLET, FILM COATED ORAL AT BEDTIME
Status: DISCONTINUED | OUTPATIENT
Start: 2021-06-30 | End: 2021-07-01 | Stop reason: HOSPADM

## 2021-06-30 RX ORDER — HEPARIN SODIUM 5000 [USP'U]/ML
5000 INJECTION, SOLUTION INTRAVENOUS; SUBCUTANEOUS EVERY 8 HOURS SCHEDULED
Status: DISCONTINUED | OUTPATIENT
Start: 2021-06-30 | End: 2021-07-01 | Stop reason: HOSPADM

## 2021-06-30 RX ORDER — ARIPIPRAZOLE 15 MG/1
15 TABLET ORAL DAILY
Status: DISCONTINUED | OUTPATIENT
Start: 2021-06-30 | End: 2021-07-01 | Stop reason: HOSPADM

## 2021-06-30 RX ORDER — AMOXICILLIN 250 MG
1 CAPSULE ORAL 2 TIMES DAILY PRN
Status: DISCONTINUED | OUTPATIENT
Start: 2021-06-30 | End: 2021-07-01 | Stop reason: HOSPADM

## 2021-06-30 RX ORDER — ARIPIPRAZOLE 15 MG/1
15 TABLET ORAL DAILY
COMMUNITY
Start: 2021-03-31 | End: 2023-01-20 | Stop reason: ALTCHOICE

## 2021-06-30 RX ORDER — ATORVASTATIN CALCIUM 40 MG/1
40 TABLET, FILM COATED ORAL NIGHTLY
Status: DISCONTINUED | OUTPATIENT
Start: 2021-06-30 | End: 2021-07-01 | Stop reason: HOSPADM

## 2021-06-30 RX ORDER — LIDOCAINE 50 MG/G
1 OINTMENT TOPICAL DAILY
COMMUNITY
Start: 2021-05-28

## 2021-06-30 RX ORDER — PREGABALIN 75 MG/1
75 CAPSULE ORAL 2 TIMES DAILY
COMMUNITY
Start: 2021-03-31 | End: 2023-01-20 | Stop reason: ALTCHOICE

## 2021-06-30 RX ORDER — PREGABALIN 75 MG/1
75 CAPSULE ORAL 2 TIMES DAILY
Status: DISCONTINUED | OUTPATIENT
Start: 2021-06-30 | End: 2021-07-01 | Stop reason: HOSPADM

## 2021-06-30 RX ORDER — ATORVASTATIN CALCIUM 40 MG/1
40 TABLET, FILM COATED ORAL DAILY
COMMUNITY
End: 2023-01-20 | Stop reason: ALTCHOICE

## 2021-06-30 RX ORDER — DEXAMETHASONE 4 MG/1
1 TABLET ORAL 2 TIMES DAILY
COMMUNITY
Start: 2021-04-05

## 2021-06-30 RX ORDER — LIDOCAINE 50 MG/G
OINTMENT TOPICAL PRN
Status: DISCONTINUED | OUTPATIENT
Start: 2021-06-30 | End: 2021-07-01 | Stop reason: HOSPADM

## 2021-06-30 RX ORDER — FLUTICASONE PROPIONATE 110 UG/1
1 AEROSOL, METERED RESPIRATORY (INHALATION) 2 TIMES DAILY
Status: DISCONTINUED | OUTPATIENT
Start: 2021-06-30 | End: 2021-07-01 | Stop reason: HOSPADM

## 2021-06-30 RX ORDER — MAGNESIUM HYDROXIDE/ALUMINUM HYDROXICE/SIMETHICONE 120; 1200; 1200 MG/30ML; MG/30ML; MG/30ML
20 SUSPENSION ORAL EVERY 4 HOURS PRN
Status: DISCONTINUED | OUTPATIENT
Start: 2021-06-30 | End: 2021-07-01 | Stop reason: HOSPADM

## 2021-06-30 RX ADMIN — ATORVASTATIN CALCIUM 40 MG: 40 TABLET ORAL at 21:30

## 2021-06-30 RX ADMIN — ASPIRIN 300 MG: 300 SUPPOSITORY RECTAL at 06:00

## 2021-06-30 RX ADMIN — SODIUM CHLORIDE, PRESERVATIVE FREE 2 ML: 5 INJECTION INTRAVENOUS at 09:00

## 2021-06-30 RX ADMIN — PREGABALIN 75 MG: 75 CAPSULE ORAL at 15:35

## 2021-06-30 RX ADMIN — SODIUM CHLORIDE, PRESERVATIVE FREE 2 ML: 5 INJECTION INTRAVENOUS at 21:30

## 2021-06-30 RX ADMIN — IOHEXOL 75 ML: 350 INJECTION, SOLUTION INTRAVENOUS at 04:54

## 2021-06-30 RX ADMIN — LEVOTHYROXINE SODIUM 125 MCG: 0.03 TABLET ORAL at 15:35

## 2021-06-30 RX ADMIN — PREGABALIN 75 MG: 75 CAPSULE ORAL at 21:30

## 2021-06-30 RX ADMIN — TRAMADOL HYDROCHLORIDE 50 MG: 50 TABLET ORAL at 22:06

## 2021-06-30 RX ADMIN — QUETIAPINE 50 MG: 25 TABLET, FILM COATED ORAL at 21:30

## 2021-06-30 RX ADMIN — HEPARIN SODIUM 5000 UNITS: 5000 INJECTION INTRAVENOUS; SUBCUTANEOUS at 21:30

## 2021-06-30 RX ADMIN — IOHEXOL 75 ML: 350 INJECTION, SOLUTION INTRAVENOUS at 04:55

## 2021-06-30 RX ADMIN — ARIPIPRAZOLE 15 MG: 15 TABLET ORAL at 15:35

## 2021-06-30 ASSESSMENT — ENCOUNTER SYMPTOMS
ALLERGIC/IMMUNOLOGIC NEGATIVE: 1
GASTROINTESTINAL NEGATIVE: 1
HEMATOLOGIC/LYMPHATIC NEGATIVE: 1
ENDOCRINE NEGATIVE: 1
RESPIRATORY NEGATIVE: 1
EYES NEGATIVE: 1
PSYCHIATRIC NEGATIVE: 1
CONSTITUTIONAL NEGATIVE: 1
SPEECH DIFFICULTY: 1
WEAKNESS: 1

## 2021-06-30 ASSESSMENT — MOVEMENT AND STRENGTH ASSESSMENTS
LUE: DRIFTS WITH LIMB EXTENSION
LLE: EQUAL STRENGTH/TONE/MOVEMENT
RUE: EQUAL STRENGTH/TONE/MOVEMENT
LLE: EQUAL STRENGTH/TONE/MOVEMENT
RLE: EQUAL STRENGTH/TONE/MOVEMENT
RUE: EQUAL STRENGTH/TONE/MOVEMENT
LLE: EQUAL STRENGTH/TONE/MOVEMENT
LUE: DRIFTS WITH LIMB EXTENSION
RLE: EQUAL STRENGTH/TONE/MOVEMENT
LLE: EQUAL STRENGTH/TONE/MOVEMENT
RLE: EQUAL STRENGTH/TONE/MOVEMENT
RLE: EQUAL STRENGTH/TONE/MOVEMENT
LUE: DRIFTS WITH LIMB EXTENSION
LUE: DRIFTS WITH LIMB EXTENSION

## 2021-06-30 ASSESSMENT — PAIN SCALES - GENERAL
PAINLEVEL_OUTOF10: 7
PAINLEVEL_OUTOF10: 0
PAINLEVEL_OUTOF10: 0

## 2021-06-30 ASSESSMENT — PATIENT HEALTH QUESTIONNAIRE - PHQ9: IS PATIENT ABLE TO COMPLETE PHQ2 OR PHQ9: NO, DEFER TO LATER TIME

## 2021-07-01 ENCOUNTER — APPOINTMENT (OUTPATIENT)
Dept: CARDIOLOGY | Age: 51
End: 2021-07-01
Attending: HOSPITALIST

## 2021-07-01 VITALS
WEIGHT: 160 LBS | HEIGHT: 74 IN | DIASTOLIC BLOOD PRESSURE: 75 MMHG | OXYGEN SATURATION: 98 % | BODY MASS INDEX: 20.53 KG/M2 | HEART RATE: 64 BPM | RESPIRATION RATE: 18 BRPM | TEMPERATURE: 99 F | SYSTOLIC BLOOD PRESSURE: 109 MMHG

## 2021-07-01 PROBLEM — F14.10 COCAINE ABUSE (CMD): Status: ACTIVE | Noted: 2021-07-01

## 2021-07-01 LAB
ALBUMIN SERPL-MCNC: 2.8 G/DL (ref 3.6–5.1)
ALBUMIN/GLOB SERPL: 0.8 {RATIO} (ref 1–2.4)
ALP SERPL-CCNC: 61 UNITS/L (ref 45–117)
ALT SERPL-CCNC: 44 UNITS/L
ANION GAP SERPL CALC-SCNC: 9 MMOL/L (ref 10–20)
AST SERPL-CCNC: 38 UNITS/L
BASOPHILS # BLD: 0 K/MCL (ref 0–0.3)
BASOPHILS NFR BLD: 0 %
BILIRUB SERPL-MCNC: 0.5 MG/DL (ref 0.2–1)
BUN SERPL-MCNC: 13 MG/DL (ref 6–20)
BUN/CREAT SERPL: 13 (ref 7–25)
CALCIUM SERPL-MCNC: 7.7 MG/DL (ref 8.4–10.2)
CHLORIDE SERPL-SCNC: 107 MMOL/L (ref 98–107)
CHOLEST SERPL-MCNC: 201 MG/DL
CHOLEST/HDLC SERPL: 1.8 {RATIO}
CO2 SERPL-SCNC: 29 MMOL/L (ref 21–32)
CREAT SERPL-MCNC: 0.99 MG/DL (ref 0.67–1.17)
DEPRECATED RDW RBC: 51.2 FL (ref 39–50)
EOSINOPHIL # BLD: 0 K/MCL (ref 0–0.5)
EOSINOPHIL NFR BLD: 1 %
ERYTHROCYTE [DISTWIDTH] IN BLOOD: 14.6 % (ref 11–15)
FASTING DURATION TIME PATIENT: ABNORMAL H
FASTING DURATION TIME PATIENT: ABNORMAL H
GFR SERPLBLD BASED ON 1.73 SQ M-ARVRAT: >90 ML/MIN/1.73M2
GLOBULIN SER-MCNC: 3.3 G/DL (ref 2–4)
GLUCOSE SERPL-MCNC: 102 MG/DL (ref 65–99)
HBA1C MFR BLD: 5.8 % (ref 4.5–5.6)
HCT VFR BLD CALC: 37.5 % (ref 39–51)
HDLC SERPL-MCNC: 112 MG/DL
HGB BLD-MCNC: 12.3 G/DL (ref 13–17)
IMM GRANULOCYTES # BLD AUTO: 0 K/MCL (ref 0–0.2)
IMM GRANULOCYTES # BLD: 0 %
LDLC SERPL CALC-MCNC: 79 MG/DL
LYMPHOCYTES # BLD: 1.4 K/MCL (ref 1–4)
LYMPHOCYTES NFR BLD: 45 %
MAGNESIUM SERPL-MCNC: 2 MG/DL (ref 1.7–2.4)
MCH RBC QN AUTO: 30.8 PG (ref 26–34)
MCHC RBC AUTO-ENTMCNC: 32.8 G/DL (ref 32–36.5)
MCV RBC AUTO: 94 FL (ref 78–100)
MONOCYTES # BLD: 0.4 K/MCL (ref 0.3–0.9)
MONOCYTES NFR BLD: 11 %
NEUTROPHILS # BLD: 1.4 K/MCL (ref 1.8–7.7)
NEUTROPHILS NFR BLD: 43 %
NONHDLC SERPL-MCNC: 89 MG/DL
NRBC BLD MANUAL-RTO: 0 /100 WBC
PLAT MORPH BLD: NORMAL
PLATELET # BLD AUTO: 209 K/MCL (ref 140–450)
POTASSIUM SERPL-SCNC: 3.8 MMOL/L (ref 3.4–5.1)
PROT SERPL-MCNC: 6.1 G/DL (ref 6.4–8.2)
RBC # BLD: 3.99 MIL/MCL (ref 4.5–5.9)
RBC MORPH BLD: NORMAL
SODIUM SERPL-SCNC: 141 MMOL/L (ref 135–145)
T4 FREE SERPL-MCNC: 0.8 NG/DL (ref 0.8–1.5)
TRIGL SERPL-MCNC: 49 MG/DL
TSH SERPL-ACNC: 19.98 MCUNITS/ML (ref 0.35–5)
WBC # BLD: 3.2 K/MCL (ref 4.2–11)
WBC MORPH BLD: NORMAL

## 2021-07-01 PROCEDURE — G0378 HOSPITAL OBSERVATION PER HR: HCPCS

## 2021-07-01 PROCEDURE — 10004651 HB RX, NO CHARGE ITEM: Performed by: HOSPITALIST

## 2021-07-01 PROCEDURE — 84439 ASSAY OF FREE THYROXINE: CPT | Performed by: GENERAL ACUTE CARE HOSPITAL

## 2021-07-01 PROCEDURE — 97161 PT EVAL LOW COMPLEX 20 MIN: CPT

## 2021-07-01 PROCEDURE — 80053 COMPREHEN METABOLIC PANEL: CPT | Performed by: GENERAL ACUTE CARE HOSPITAL

## 2021-07-01 PROCEDURE — 84443 ASSAY THYROID STIM HORMONE: CPT | Performed by: GENERAL ACUTE CARE HOSPITAL

## 2021-07-01 PROCEDURE — 99217 OBSERVATION CARE DISCHARGE: CPT | Performed by: HOSPITALIST

## 2021-07-01 PROCEDURE — 93306 TTE W/DOPPLER COMPLETE: CPT

## 2021-07-01 PROCEDURE — 80061 LIPID PANEL: CPT | Performed by: GENERAL ACUTE CARE HOSPITAL

## 2021-07-01 PROCEDURE — 96372 THER/PROPH/DIAG INJ SC/IM: CPT

## 2021-07-01 PROCEDURE — 10002800 HB RX 250 W HCPCS: Performed by: GENERAL ACUTE CARE HOSPITAL

## 2021-07-01 PROCEDURE — 99232 SBSQ HOSP IP/OBS MODERATE 35: CPT | Performed by: PSYCHIATRY & NEUROLOGY

## 2021-07-01 PROCEDURE — 83036 HEMOGLOBIN GLYCOSYLATED A1C: CPT | Performed by: GENERAL ACUTE CARE HOSPITAL

## 2021-07-01 PROCEDURE — 97116 GAIT TRAINING THERAPY: CPT

## 2021-07-01 PROCEDURE — 83735 ASSAY OF MAGNESIUM: CPT | Performed by: GENERAL ACUTE CARE HOSPITAL

## 2021-07-01 PROCEDURE — 97165 OT EVAL LOW COMPLEX 30 MIN: CPT

## 2021-07-01 PROCEDURE — 85025 COMPLETE CBC W/AUTO DIFF WBC: CPT | Performed by: GENERAL ACUTE CARE HOSPITAL

## 2021-07-01 PROCEDURE — 10002803 HB RX 637: Performed by: HOSPITALIST

## 2021-07-01 PROCEDURE — 10004651 HB RX, NO CHARGE ITEM: Performed by: GENERAL ACUTE CARE HOSPITAL

## 2021-07-01 PROCEDURE — 36415 COLL VENOUS BLD VENIPUNCTURE: CPT | Performed by: GENERAL ACUTE CARE HOSPITAL

## 2021-07-01 RX ORDER — ASPIRIN 81 MG/1
81 TABLET ORAL DAILY
Qty: 30 TABLET | Refills: 0 | Status: SHIPPED | OUTPATIENT
Start: 2021-07-02 | End: 2023-01-20 | Stop reason: ALTCHOICE

## 2021-07-01 RX ADMIN — FLUTICASONE PROPIONATE 1 PUFF: 110 AEROSOL, METERED RESPIRATORY (INHALATION) at 10:14

## 2021-07-01 RX ADMIN — ARIPIPRAZOLE 15 MG: 15 TABLET ORAL at 12:19

## 2021-07-01 RX ADMIN — HEPARIN SODIUM 5000 UNITS: 5000 INJECTION INTRAVENOUS; SUBCUTANEOUS at 05:51

## 2021-07-01 RX ADMIN — ASPIRIN 81 MG: 81 TABLET, COATED ORAL at 10:12

## 2021-07-01 RX ADMIN — LEVOTHYROXINE SODIUM 125 MCG: 0.03 TABLET ORAL at 10:11

## 2021-07-01 RX ADMIN — PREGABALIN 75 MG: 75 CAPSULE ORAL at 10:12

## 2021-07-01 RX ADMIN — SODIUM CHLORIDE, PRESERVATIVE FREE 2 ML: 5 INJECTION INTRAVENOUS at 10:12

## 2021-07-01 ASSESSMENT — ACTIVITIES OF DAILY LIVING (ADL)
PRIOR_ADL: INDEPENDENT
GROOMING: INDEPENDENT
PRIOR_ADL_TOILETING: INDEPENDENT
PRIOR_ADL_BATHING: INDEPENDENT
EATING: INDEPENDENT

## 2021-07-01 ASSESSMENT — COGNITIVE AND FUNCTIONAL STATUS - GENERAL
DO YOU HAVE DIFFICULTY DRESSING OR BATHING: NO
APPLIED_COGNITIVE_CONVERTED_SCORE: 62.21
DAILY_ACTIVITY_RAW_SCORE: 24
BASIC_MOBILITY_CONVERTED_SCORE: 43.99
BASIC_MOBILITY_RAW_SCORE: 20
DAILY_ACTIVITY_CONVERTED_SCORE: 57.54
BECAUSE OF A PHYSICAL, MENTAL, OR EMOTIONAL CONDITION, DO YOU HAVE DIFFICULTY DOING ERRANDS ALONE: NO
BECAUSE OF A PHYSICAL, MENTAL, OR EMOTIONAL CONDITION, DO YOU HAVE SERIOUS DIFFICULTY CONCENTRATING, REMEMBERING OR MAKING DECISIONS: NO
APPLIED_COGNITIVE_RAW_SCORE: 24
DO YOU HAVE SERIOUS DIFFICULTY WALKING OR CLIMBING STAIRS: NO

## 2021-07-01 ASSESSMENT — ENCOUNTER SYMPTOMS: PAIN SEVERITY NOW: 10

## 2021-07-01 ASSESSMENT — PAIN SCALES - GENERAL
PAINLEVEL_OUTOF10: 5
PAINLEVEL_OUTOF10: 0

## 2021-07-02 LAB
ATRIAL RATE (BPM): 52
P AXIS (DEGREES): 82
PR-INTERVAL (MSEC): 152
QRS-INTERVAL (MSEC): 96
QT-INTERVAL (MSEC): 508
QTC: 472
R AXIS (DEGREES): 84
REPORT TEXT: NORMAL
T AXIS (DEGREES): 81
VENTRICULAR RATE EKG/MIN (BPM): 52

## 2023-01-20 ENCOUNTER — HOSPITAL ENCOUNTER (INPATIENT)
Age: 53
LOS: 1 days | Discharge: HOME | DRG: 203 | End: 2023-01-20
Attending: INTERNAL MEDICINE

## 2023-01-20 ENCOUNTER — APPOINTMENT (OUTPATIENT)
Dept: CARDIOLOGY | Age: 53
DRG: 203 | End: 2023-01-20
Attending: INTERNAL MEDICINE

## 2023-01-20 ENCOUNTER — APPOINTMENT (OUTPATIENT)
Dept: CARDIOLOGY | Age: 53
DRG: 203 | End: 2023-01-20

## 2023-01-20 ENCOUNTER — APPOINTMENT (OUTPATIENT)
Dept: NUCLEAR MEDICINE | Age: 53
DRG: 203 | End: 2023-01-20
Attending: INTERNAL MEDICINE

## 2023-01-20 VITALS
DIASTOLIC BLOOD PRESSURE: 45 MMHG | HEART RATE: 61 BPM | TEMPERATURE: 97.7 F | RESPIRATION RATE: 20 BRPM | SYSTOLIC BLOOD PRESSURE: 90 MMHG | WEIGHT: 148.81 LBS | BODY MASS INDEX: 19.1 KG/M2 | HEIGHT: 74 IN | OXYGEN SATURATION: 96 %

## 2023-01-20 PROBLEM — R07.9 CHEST PAIN: Status: ACTIVE | Noted: 2023-01-20

## 2023-01-20 LAB
ANION GAP SERPL CALC-SCNC: 10 MMOL/L (ref 7–19)
ANION GAP SERPL CALC-SCNC: 10 MMOL/L (ref 7–19)
APTT PPP: 29 SEC (ref 22–30)
BASOPHILS # BLD: 0 K/MCL (ref 0–0.3)
BASOPHILS NFR BLD: 0 %
BUN SERPL-MCNC: 17 MG/DL (ref 6–20)
BUN SERPL-MCNC: 20 MG/DL (ref 6–20)
BUN/CREAT SERPL: 16 (ref 7–25)
BUN/CREAT SERPL: 17 (ref 7–25)
CALCIUM SERPL-MCNC: 8.5 MG/DL (ref 8.4–10.2)
CALCIUM SERPL-MCNC: 8.6 MG/DL (ref 8.4–10.2)
CHLORIDE SERPL-SCNC: 107 MMOL/L (ref 97–110)
CHLORIDE SERPL-SCNC: 107 MMOL/L (ref 97–110)
CO2 SERPL-SCNC: 27 MMOL/L (ref 21–32)
CO2 SERPL-SCNC: 28 MMOL/L (ref 21–32)
CREAT SERPL-MCNC: 1.07 MG/DL (ref 0.67–1.17)
CREAT SERPL-MCNC: 1.2 MG/DL (ref 0.67–1.17)
DEPRECATED RDW RBC: 50.4 FL (ref 39–50)
EOSINOPHIL # BLD: 0.1 K/MCL (ref 0–0.5)
EOSINOPHIL NFR BLD: 1 %
ERYTHROCYTE [DISTWIDTH] IN BLOOD: 14.3 % (ref 11–15)
FASTING DURATION TIME PATIENT: ABNORMAL H
FASTING DURATION TIME PATIENT: ABNORMAL H
GFR SERPLBLD BASED ON 1.73 SQ M-ARVRAT: 72 ML/MIN
GFR SERPLBLD BASED ON 1.73 SQ M-ARVRAT: 82 ML/MIN
GLUCOSE SERPL-MCNC: 124 MG/DL (ref 70–99)
GLUCOSE SERPL-MCNC: 90 MG/DL (ref 70–99)
HCT VFR BLD CALC: 36.3 % (ref 39–51)
HGB BLD-MCNC: 12.1 G/DL (ref 13–17)
IMM GRANULOCYTES # BLD AUTO: 0 K/MCL (ref 0–0.2)
IMM GRANULOCYTES # BLD: 0 %
INR PPP: 0.9
LYMPHOCYTES # BLD: 1.9 K/MCL (ref 1–4)
LYMPHOCYTES NFR BLD: 45 %
MAGNESIUM SERPL-MCNC: 2.1 MG/DL (ref 1.7–2.4)
MCH RBC QN AUTO: 32.1 PG (ref 26–34)
MCHC RBC AUTO-ENTMCNC: 33.3 G/DL (ref 32–36.5)
MCV RBC AUTO: 96.3 FL (ref 78–100)
MONOCYTES # BLD: 0.6 K/MCL (ref 0.3–0.9)
MONOCYTES NFR BLD: 13 %
NEUTROPHILS # BLD: 1.8 K/MCL (ref 1.8–7.7)
NEUTROPHILS NFR BLD: 41 %
NRBC BLD MANUAL-RTO: 0 /100 WBC
NT-PROBNP SERPL-MCNC: 5 PG/ML
PLATELET # BLD AUTO: 219 K/MCL (ref 140–450)
POTASSIUM SERPL-SCNC: 3.9 MMOL/L (ref 3.4–5.1)
POTASSIUM SERPL-SCNC: 4 MMOL/L (ref 3.4–5.1)
PROTHROMBIN TIME: 10 SEC (ref 9.7–11.8)
RAINBOW EXTRA TUBES HOLD SPECIMEN: NORMAL
RBC # BLD: 3.77 MIL/MCL (ref 4.5–5.9)
SARS-COV-2 RNA RESP QL NAA+PROBE: NOT DETECTED
SERVICE CMNT-IMP: NORMAL
SERVICE CMNT-IMP: NORMAL
SODIUM SERPL-SCNC: 140 MMOL/L (ref 135–145)
SODIUM SERPL-SCNC: 141 MMOL/L (ref 135–145)
STRESS TARGET HR: 166 BPM
TROPONIN I SERPL DL<=0.01 NG/ML-MCNC: 6 NG/L
TROPONIN I SERPL DL<=0.01 NG/ML-MCNC: 6 NG/L
WBC # BLD: 4.3 K/MCL (ref 4.2–11)

## 2023-01-20 PROCEDURE — 85610 PROTHROMBIN TIME: CPT | Performed by: INTERNAL MEDICINE

## 2023-01-20 PROCEDURE — G1004 CDSM NDSC: HCPCS

## 2023-01-20 PROCEDURE — 10002800 HB RX 250 W HCPCS: Performed by: STUDENT IN AN ORGANIZED HEALTH CARE EDUCATION/TRAINING PROGRAM

## 2023-01-20 PROCEDURE — 10004651 HB RX, NO CHARGE ITEM

## 2023-01-20 PROCEDURE — 13003287

## 2023-01-20 PROCEDURE — 99255 IP/OBS CONSLTJ NEW/EST HI 80: CPT | Performed by: STUDENT IN AN ORGANIZED HEALTH CARE EDUCATION/TRAINING PROGRAM

## 2023-01-20 PROCEDURE — 93010 ELECTROCARDIOGRAM REPORT: CPT | Performed by: INTERNAL MEDICINE

## 2023-01-20 PROCEDURE — G1004 CDSM NDSC: HCPCS | Performed by: INTERNAL MEDICINE

## 2023-01-20 PROCEDURE — 80048 BASIC METABOLIC PNL TOTAL CA: CPT | Performed by: INTERNAL MEDICINE

## 2023-01-20 PROCEDURE — 93005 ELECTROCARDIOGRAM TRACING: CPT

## 2023-01-20 PROCEDURE — 36415 COLL VENOUS BLD VENIPUNCTURE: CPT | Performed by: INTERNAL MEDICINE

## 2023-01-20 PROCEDURE — 93018 CV STRESS TEST I&R ONLY: CPT | Performed by: INTERNAL MEDICINE

## 2023-01-20 PROCEDURE — 83880 ASSAY OF NATRIURETIC PEPTIDE: CPT

## 2023-01-20 PROCEDURE — 83735 ASSAY OF MAGNESIUM: CPT

## 2023-01-20 PROCEDURE — A9502 TC99M TETROFOSMIN: HCPCS | Performed by: INTERNAL MEDICINE

## 2023-01-20 PROCEDURE — 10006150 HB RX 343: Performed by: INTERNAL MEDICINE

## 2023-01-20 PROCEDURE — 99223 1ST HOSP IP/OBS HIGH 75: CPT | Performed by: INTERNAL MEDICINE

## 2023-01-20 PROCEDURE — 87635 SARS-COV-2 COVID-19 AMP PRB: CPT | Performed by: INTERNAL MEDICINE

## 2023-01-20 PROCEDURE — 84484 ASSAY OF TROPONIN QUANT: CPT | Performed by: INTERNAL MEDICINE

## 2023-01-20 PROCEDURE — 10002807 HB RX 258

## 2023-01-20 PROCEDURE — 85730 THROMBOPLASTIN TIME PARTIAL: CPT | Performed by: INTERNAL MEDICINE

## 2023-01-20 PROCEDURE — 10006031 HB ROOM CHARGE TELEMETRY

## 2023-01-20 PROCEDURE — 93017 CV STRESS TEST TRACING ONLY: CPT

## 2023-01-20 PROCEDURE — 78451 HT MUSCLE IMAGE SPECT SING: CPT | Performed by: INTERNAL MEDICINE

## 2023-01-20 PROCEDURE — 85025 COMPLETE CBC W/AUTO DIFF WBC: CPT

## 2023-01-20 PROCEDURE — 80048 BASIC METABOLIC PNL TOTAL CA: CPT

## 2023-01-20 PROCEDURE — 10002803 HB RX 637

## 2023-01-20 PROCEDURE — 93016 CV STRESS TEST SUPVJ ONLY: CPT | Performed by: INTERNAL MEDICINE

## 2023-01-20 PROCEDURE — 78451 HT MUSCLE IMAGE SPECT SING: CPT

## 2023-01-20 PROCEDURE — 84484 ASSAY OF TROPONIN QUANT: CPT

## 2023-01-20 RX ORDER — PAROXETINE HYDROCHLORIDE 20 MG/1
20 TABLET, FILM COATED ORAL EVERY MORNING
COMMUNITY

## 2023-01-20 RX ORDER — TRIHEXYPHENIDYL HYDROCHLORIDE 5 MG/1
5 TABLET ORAL EVERY EVENING
COMMUNITY

## 2023-01-20 RX ORDER — HEPARIN SODIUM 1000 [USP'U]/ML
4000 INJECTION, SOLUTION INTRAVENOUS; SUBCUTANEOUS ONCE
Status: DISCONTINUED | OUTPATIENT
Start: 2023-01-20 | End: 2023-01-20

## 2023-01-20 RX ORDER — HEPARIN SODIUM 10000 [USP'U]/100ML
1-30 INJECTION, SOLUTION INTRAVENOUS CONTINUOUS
Status: DISCONTINUED | OUTPATIENT
Start: 2023-01-20 | End: 2023-01-20

## 2023-01-20 RX ORDER — PREGABALIN 75 MG/1
75 CAPSULE ORAL 2 TIMES DAILY
Status: DISCONTINUED | OUTPATIENT
Start: 2023-01-20 | End: 2023-01-20 | Stop reason: HOSPADM

## 2023-01-20 RX ORDER — GABAPENTIN 300 MG/1
300 CAPSULE ORAL DAILY
COMMUNITY

## 2023-01-20 RX ORDER — 0.9 % SODIUM CHLORIDE 0.9 %
2 VIAL (ML) INJECTION EVERY 12 HOURS SCHEDULED
Status: DISCONTINUED | OUTPATIENT
Start: 2023-01-20 | End: 2023-01-20 | Stop reason: HOSPADM

## 2023-01-20 RX ORDER — SODIUM CHLORIDE, SODIUM LACTATE, POTASSIUM CHLORIDE, CALCIUM CHLORIDE 600; 310; 30; 20 MG/100ML; MG/100ML; MG/100ML; MG/100ML
INJECTION, SOLUTION INTRAVENOUS CONTINUOUS
Status: DISCONTINUED | OUTPATIENT
Start: 2023-01-20 | End: 2023-01-20 | Stop reason: HOSPADM

## 2023-01-20 RX ORDER — HEPARIN SODIUM 1000 [USP'U]/ML
30 INJECTION, SOLUTION INTRAVENOUS; SUBCUTANEOUS PRN
Status: DISCONTINUED | OUTPATIENT
Start: 2023-01-20 | End: 2023-01-20

## 2023-01-20 RX ORDER — ENOXAPARIN SODIUM 100 MG/ML
40 INJECTION SUBCUTANEOUS DAILY
Status: DISCONTINUED | OUTPATIENT
Start: 2023-01-21 | End: 2023-01-20

## 2023-01-20 RX ORDER — HEPARIN SODIUM 1000 [USP'U]/ML
4000 INJECTION, SOLUTION INTRAVENOUS; SUBCUTANEOUS PRN
Status: DISCONTINUED | OUTPATIENT
Start: 2023-01-20 | End: 2023-01-20

## 2023-01-20 RX ORDER — ARIPIPRAZOLE 400 MG
400 KIT INTRAMUSCULAR
COMMUNITY
Start: 2022-10-20 | End: 2023-05-03

## 2023-01-20 RX ORDER — REGADENOSON 0.08 MG/ML
0.4 INJECTION, SOLUTION INTRAVENOUS ONCE
Status: COMPLETED | OUTPATIENT
Start: 2023-01-20 | End: 2023-01-20

## 2023-01-20 RX ORDER — ASPIRIN 81 MG/1
81 TABLET, CHEWABLE ORAL DAILY
Status: DISCONTINUED | OUTPATIENT
Start: 2023-01-20 | End: 2023-01-20 | Stop reason: HOSPADM

## 2023-01-20 RX ORDER — NITROGLYCERIN 0.4 MG/1
0.4 TABLET SUBLINGUAL EVERY 5 MIN PRN
Status: DISCONTINUED | OUTPATIENT
Start: 2023-01-20 | End: 2023-01-20 | Stop reason: HOSPADM

## 2023-01-20 RX ORDER — RISPERIDONE 2 MG/1
2 TABLET ORAL DAILY
COMMUNITY

## 2023-01-20 RX ORDER — ATORVASTATIN CALCIUM 20 MG/1
40 TABLET, FILM COATED ORAL DAILY
Status: DISCONTINUED | OUTPATIENT
Start: 2023-01-20 | End: 2023-01-20 | Stop reason: HOSPADM

## 2023-01-20 RX ADMIN — NITROGLYCERIN 0.4 MG: 0.4 TABLET SUBLINGUAL at 01:40

## 2023-01-20 RX ADMIN — NITROGLYCERIN 0.4 MG: 0.4 TABLET SUBLINGUAL at 14:23

## 2023-01-20 RX ADMIN — NITROGLYCERIN 0.4 MG: 0.4 TABLET SUBLINGUAL at 01:21

## 2023-01-20 RX ADMIN — TETROFOSMIN 8 MILLICURIE: 1.38 INJECTION, POWDER, LYOPHILIZED, FOR SOLUTION INTRAVENOUS at 09:50

## 2023-01-20 RX ADMIN — REGADENOSON 0.4 MG: 0.08 INJECTION, SOLUTION INTRAVENOUS at 09:50

## 2023-01-20 RX ADMIN — SODIUM CHLORIDE, PRESERVATIVE FREE 2 ML: 5 INJECTION INTRAVENOUS at 01:31

## 2023-01-20 RX ADMIN — SODIUM CHLORIDE, POTASSIUM CHLORIDE, SODIUM LACTATE AND CALCIUM CHLORIDE: 600; 310; 30; 20 INJECTION, SOLUTION INTRAVENOUS at 01:03

## 2023-01-20 RX ADMIN — NITROGLYCERIN 0.4 MG: 0.4 TABLET SUBLINGUAL at 01:32

## 2023-01-20 SDOH — ECONOMIC STABILITY: HOUSING INSECURITY: WHAT IS YOUR LIVING SITUATION TODAY?: ALONE

## 2023-01-20 SDOH — ECONOMIC STABILITY: HOUSING INSECURITY: WHAT IS YOUR LIVING SITUATION TODAY?: APARTMENT

## 2023-01-20 ASSESSMENT — ENCOUNTER SYMPTOMS
GASTROINTESTINAL NEGATIVE: 1
ENDOCRINE NEGATIVE: 1
HEMATOLOGIC/LYMPHATIC NEGATIVE: 1
EYES NEGATIVE: 1
RESPIRATORY NEGATIVE: 1
ALLERGIC/IMMUNOLOGIC NEGATIVE: 1
FOCAL WEAKNESS: 1
PSYCHIATRIC NEGATIVE: 1

## 2023-01-20 ASSESSMENT — COGNITIVE AND FUNCTIONAL STATUS - GENERAL
DO YOU HAVE DIFFICULTY DRESSING OR BATHING: NO
BECAUSE OF A PHYSICAL, MENTAL, OR EMOTIONAL CONDITION, DO YOU HAVE SERIOUS DIFFICULTY CONCENTRATING, REMEMBERING OR MAKING DECISIONS: NO
DO YOU HAVE SERIOUS DIFFICULTY WALKING OR CLIMBING STAIRS: NO
BECAUSE OF A PHYSICAL, MENTAL, OR EMOTIONAL CONDITION, DO YOU HAVE DIFFICULTY DOING ERRANDS ALONE: NO

## 2023-01-20 ASSESSMENT — LIFESTYLE VARIABLES: HOW OFTEN DO YOU HAVE A DRINK CONTAINING ALCOHOL: 2 TO 3 TIMES PER WEEK

## 2023-01-20 ASSESSMENT — PATIENT HEALTH QUESTIONNAIRE - PHQ9: IS PATIENT ABLE TO COMPLETE PHQ2 OR PHQ9: NO, DEFER TO LATER TIME

## 2023-01-20 ASSESSMENT — PAIN SCALES - GENERAL
PAINLEVEL_OUTOF10: 0
PAINLEVEL_OUTOF10: 10

## 2023-01-21 LAB
ATRIAL RATE (BPM): 47
ATRIAL RATE (BPM): 49
ATRIAL RATE (BPM): 50
ATRIAL RATE (BPM): 54
ATRIAL RATE (BPM): 55
P AXIS (DEGREES): 81
P AXIS (DEGREES): 81
P AXIS (DEGREES): 83
PR-INTERVAL (MSEC): 150
PR-INTERVAL (MSEC): 152
PR-INTERVAL (MSEC): 160
PR-INTERVAL (MSEC): 162
PR-INTERVAL (MSEC): 166
QRS-INTERVAL (MSEC): 102
QRS-INTERVAL (MSEC): 88
QRS-INTERVAL (MSEC): 92
QRS-INTERVAL (MSEC): 94
QRS-INTERVAL (MSEC): 94
QT-INTERVAL (MSEC): 468
QT-INTERVAL (MSEC): 472
QT-INTERVAL (MSEC): 474
QT-INTERVAL (MSEC): 484
QT-INTERVAL (MSEC): 492
QTC: 432
QTC: 435
QTC: 437
QTC: 443
QTC: 451
R AXIS (DEGREES): 82
R AXIS (DEGREES): 83
R AXIS (DEGREES): 85
R AXIS (DEGREES): 88
R AXIS (DEGREES): 91
REPORT TEXT: NORMAL
T AXIS (DEGREES): 67
T AXIS (DEGREES): 68
T AXIS (DEGREES): 69
T AXIS (DEGREES): 74
T AXIS (DEGREES): 80
VENTRICULAR RATE EKG/MIN (BPM): 47
VENTRICULAR RATE EKG/MIN (BPM): 49
VENTRICULAR RATE EKG/MIN (BPM): 50
VENTRICULAR RATE EKG/MIN (BPM): 54
VENTRICULAR RATE EKG/MIN (BPM): 55

## 2024-03-01 NOTE — DISCHARGE PLANNING
SW received MDO for d/c planning. PT/OT to evaluate pt. SW to follow up w/ recommendations when appropriate.     Yu Amaya, [address] Attending Discharge Physical Examination:

## (undated) NOTE — LETTER
17          Juan Roland  :  1970      To Whom It May Concern: This patient was seen in the hospital 2017. .  Work status:  He is released to return to work, without restrictions.     May return to work status per above effective /